# Patient Record
Sex: MALE | Race: WHITE | Employment: OTHER | URBAN - NONMETROPOLITAN AREA
[De-identification: names, ages, dates, MRNs, and addresses within clinical notes are randomized per-mention and may not be internally consistent; named-entity substitution may affect disease eponyms.]

---

## 2018-08-26 ENCOUNTER — HOSPITAL ENCOUNTER (EMERGENCY)
Age: 58
Discharge: HOME | End: 2018-08-26
Payer: MEDICARE

## 2018-08-26 ENCOUNTER — APPOINTMENT (OUTPATIENT)
Dept: GENERAL RADIOLOGY | Age: 58
End: 2018-08-26
Payer: MEDICARE

## 2018-08-26 VITALS
WEIGHT: 240 LBS | HEIGHT: 66 IN | BODY MASS INDEX: 38.57 KG/M2 | OXYGEN SATURATION: 96 % | HEART RATE: 91 BPM | TEMPERATURE: 99.5 F | RESPIRATION RATE: 16 BRPM

## 2018-08-26 DIAGNOSIS — M54.32 SCIATICA OF LEFT SIDE: Primary | ICD-10-CM

## 2018-08-26 PROCEDURE — 99283 EMERGENCY DEPT VISIT LOW MDM: CPT

## 2018-08-26 PROCEDURE — 73502 X-RAY EXAM HIP UNI 2-3 VIEWS: CPT

## 2018-08-26 PROCEDURE — 72100 X-RAY EXAM L-S SPINE 2/3 VWS: CPT

## 2018-08-26 RX ORDER — IBUPROFEN 600 MG/1
600 TABLET ORAL EVERY 8 HOURS PRN
Qty: 20 TABLET | Refills: 0 | OUTPATIENT
Start: 2018-08-26 | End: 2021-06-10

## 2018-08-26 RX ORDER — CYCLOBENZAPRINE HCL 10 MG
10 TABLET ORAL 3 TIMES DAILY PRN
Qty: 12 TABLET | Refills: 0 | Status: SHIPPED | OUTPATIENT
Start: 2018-08-26 | End: 2021-06-10

## 2018-08-26 RX ORDER — PREDNISONE 10 MG/1
TABLET ORAL
Qty: 20 TABLET | Refills: 0 | Status: SHIPPED | OUTPATIENT
Start: 2018-08-26 | End: 2018-09-05

## 2018-08-26 ASSESSMENT — ENCOUNTER SYMPTOMS
ABDOMINAL PAIN: 0
NAUSEA: 0
EYE DISCHARGE: 0
BACK PAIN: 0
WHEEZING: 0
VOMITING: 0
SHORTNESS OF BREATH: 0
RHINORRHEA: 0
EYE REDNESS: 0
DIARRHEA: 0
COUGH: 0
SORE THROAT: 0

## 2018-08-26 ASSESSMENT — PAIN SCALES - GENERAL: PAINLEVEL_OUTOF10: 6

## 2018-08-26 ASSESSMENT — PAIN DESCRIPTION - ORIENTATION: ORIENTATION: LEFT

## 2018-08-26 ASSESSMENT — PAIN DESCRIPTION - LOCATION: LOCATION: BUTTOCKS;BACK;LEG

## 2018-08-26 ASSESSMENT — PAIN DESCRIPTION - FREQUENCY: FREQUENCY: INTERMITTENT

## 2018-08-26 ASSESSMENT — PAIN DESCRIPTION - PAIN TYPE: TYPE: ACUTE PAIN

## 2018-08-26 NOTE — ED PROVIDER NOTES
a past medical history of Arthritis; GERD (gastroesophageal reflux disease); and Hypertension. SURGICAL HISTORY      has no past surgical history on file. CURRENT MEDICATIONS       Discharge Medication List as of 8/26/2018 10:55 AM      CONTINUE these medications which have NOT CHANGED    Details   pravastatin (PRAVACHOL) 40 MG tablet Take 1 tablet by mouth daily. , Disp-30 tablet, R-0      meloxicam (MOBIC) 15 MG tablet Take 1 tablet by mouth daily. , Disp-30 tablet, R-0Normal      lisinopril-hydrochlorothiazide (PRINZIDE;ZESTORETIC) 10-12.5 MG per tablet Take 1 tablet by mouth daily. , Disp-30 tablet, R-11Normal      acetaminophen (TYLENOL) 500 MG tablet Take 500 mg by mouth every 6 hours as needed. ALLERGIES     is allergic to aspirin and pcn [penicillins]. FAMILY HISTORY     has no family status information on file. family history is not on file. SOCIAL HISTORY      reports that he has quit smoking. His smoking use included Cigarettes. He has never used smokeless tobacco. He reports that he drinks alcohol. He reports that he does not use drugs. PHYSICAL EXAM     INITIAL VITALS:  height is 5' 6\" (1.676 m) and weight is 240 lb (108.9 kg). His oral temperature is 99.5 °F (37.5 °C). His pulse is 91. His respiration is 16 and oxygen saturation is 96%. Physical Exam   Constitutional: He is oriented to person, place, and time. He appears well-developed and well-nourished. HENT:   Head: Normocephalic and atraumatic. Right Ear: External ear normal.   Left Ear: External ear normal.   Eyes: Conjunctivae are normal. Right eye exhibits no discharge. Left eye exhibits no discharge. No scleral icterus. Neck: Normal range of motion. Neck supple. No JVD present. Pulmonary/Chest: Effort normal. No stridor. No respiratory distress. Abdominal: Soft. He exhibits no distension. Musculoskeletal: Normal range of motion. He exhibits no edema.         Left hip: Normal. He exhibits normal range of motion, normal strength, no tenderness and no bony tenderness. Lumbar back: Normal. He exhibits normal range of motion, no tenderness, no bony tenderness and no swelling. Left buttock pain secondary to palpation. Neurological: He is alert and oriented to person, place, and time. He exhibits normal muscle tone. GCS eye subscore is 4. GCS verbal subscore is 5. GCS motor subscore is 6. Skin: Skin is warm and dry. He is not diaphoretic. No erythema. Psychiatric: He has a normal mood and affect. His behavior is normal.   Nursing note and vitals reviewed. DIFFERENTIAL DIAGNOSIS:   Differential diagnosis discussed with the patient and available family at the patient's bedside including but not limited to Sciatica, lumbar radiculopathy, fracture, contusion      DIAGNOSTIC RESULTS   EKG:  EKG Interpretation    Interpreted by emergency department physician assistant    None      RADIOLOGY: non-plain film images(s) such as CT, Ultrasound and MRI are read by the radiologist.  @[x] Visualized and interpreted by me  And My attending   [x] Radiologist's Wet Read Report Reviewed   [] Discussed with Radiologist.  [] Will be officially read by the radiologist at a later time. A Wet Read was entered by me. The patient had a   XR LUMBAR SPINE (2-3 VIEWS)   Final Result    No acute fracture or subluxation throughout the lumbar spine. **This report has been created using voice recognition software. It may contain minor errors which are inherent in voice recognition technology. **      Final report electronically signed by Dr Raissa Townsend on 8/26/2018 10:24 AM      XR HIP 2-3 VW W PELVIS LEFT   Final Result   There is no acute fracture or dislocation involving the left hip. **This report has been created using voice recognition software. It may contain minor errors which are inherent in voice recognition technology. **      Final report electronically signed by Dr Raissa Townsend on 8/26/2018 10:22 AM           LABS:   No results found for this visit on 08/26/18. EMERGENCY DEPARTMENT COURSE:   Vitals:    Vitals:    08/26/18 0952   Pulse: 91   Resp: 16   Temp: 99.5 °F (37.5 °C)   TempSrc: Oral   SpO2: 96%   Weight: 240 lb (108.9 kg)   Height: 5' 6\" (1.676 m)       10:03 AM Patient was seen and evaluated in a timely fashion. Patient was seen history physical exam was performed. Patient remained stable here in the emergency department. Patient's  imaging studies and physical examination findings were all reassuring. Upon re-evaluation, the patient is feeling better with a benign repeat examination. Patient was comfortable with the plan of discharge home to followup with the primary care provider in the next 3-5 days. The patient is instructed to return to the emergency department for any worsening of their symptoms. Patient was discharged from the emergency department in good condition with all questions answered. See disposition below    CRITICAL CARE:   None    CONSULTS:  None    PROCEDURES:  None    FINAL IMPRESSION      1.  Sciatica of left side          DISPOSITION/PLAN   Discharged    PATIENT REFERRED TO:  Esthela Albarado MD  [facility] [address] [facility]    Schedule an appointment as soon as possible for a visit in 3 days        DISCHARGE MEDICATIONS:  Discharge Medication List as of 8/26/2018 10:55 AM      START taking these medications    Details   predniSONE (DELTASONE) 10 MG tablet Take 4 tablets by mouth once daily for 5 days, Disp-20 tablet, R-0Print      cyclobenzaprine (FLEXERIL) 10 MG tablet Take 1 tablet by mouth 3 times daily as needed for Muscle spasms, Disp-12 tablet, R-0Print      ibuprofen (ADVIL;MOTRIN) 600 MG tablet Take 1 tablet by mouth every 8 hours as needed for Pain, Disp-20 tablet, R-0Print             (Please note that portions of this note were completed with a voice recognition program.  Efforts were made to edit the dictations but occasionally words are mis-transcribed.)    FARAZ Beard    This patient was seen independently by Dimple Coyle PA-C a Mid-Level Provider in the [facility] Emergency Department. The patient was given an opportunity to see the Emergency Attending. The patient voiced understanding that I was a Mid-Level Provider and was in agreement with being seen independently by myself. Scribe:  Jorge L Quiroz 08/26/18 10:03 AM Scribing for and in the presence of Dimple Coyle PA-C. Provider:  I personally performed the services described in the documentation, reviewed and edited the documentation which was dictated to the scribe in my presence, and it accurately records my words and actions.     Dimple Coyle PA-C 08/26/18 7:56 PM        FARAZ Beard  08/26/18 1084

## 2021-06-10 ENCOUNTER — HOSPITAL ENCOUNTER (EMERGENCY)
Age: 61
Discharge: HOME | End: 2021-06-10
Payer: MEDICARE

## 2021-06-10 ENCOUNTER — APPOINTMENT (OUTPATIENT)
Dept: GENERAL RADIOLOGY | Age: 61
End: 2021-06-10
Payer: MEDICARE

## 2021-06-10 VITALS
OXYGEN SATURATION: 98 % | BODY MASS INDEX: 36.96 KG/M2 | TEMPERATURE: 97.5 F | WEIGHT: 230 LBS | SYSTOLIC BLOOD PRESSURE: 139 MMHG | HEIGHT: 66 IN | RESPIRATION RATE: 18 BRPM | DIASTOLIC BLOOD PRESSURE: 74 MMHG | HEART RATE: 82 BPM

## 2021-06-10 DIAGNOSIS — S93.401A SPRAIN OF RIGHT ANKLE, UNSPECIFIED LIGAMENT, INITIAL ENCOUNTER: Primary | ICD-10-CM

## 2021-06-10 PROCEDURE — 99213 OFFICE O/P EST LOW 20 MIN: CPT

## 2021-06-10 PROCEDURE — 73610 X-RAY EXAM OF ANKLE: CPT

## 2021-06-10 PROCEDURE — 99213 OFFICE O/P EST LOW 20 MIN: CPT | Performed by: EMERGENCY MEDICINE

## 2021-06-10 RX ORDER — NAPROXEN 500 MG/1
500 TABLET ORAL 2 TIMES DAILY WITH MEALS
Qty: 30 TABLET | Refills: 0 | Status: SHIPPED | OUTPATIENT
Start: 2021-06-10

## 2021-06-10 ASSESSMENT — PAIN DESCRIPTION - DESCRIPTORS: DESCRIPTORS: ACHING;SORE

## 2021-06-10 ASSESSMENT — PAIN DESCRIPTION - ORIENTATION: ORIENTATION: LEFT

## 2021-06-10 ASSESSMENT — ENCOUNTER SYMPTOMS
SHORTNESS OF BREATH: 0
COLOR CHANGE: 0
COUGH: 0

## 2021-06-10 ASSESSMENT — PAIN SCALES - GENERAL: PAINLEVEL_OUTOF10: 6

## 2021-06-10 ASSESSMENT — PAIN DESCRIPTION - LOCATION: LOCATION: ANKLE

## 2021-06-10 ASSESSMENT — PAIN DESCRIPTION - ONSET: ONSET: GRADUAL

## 2021-06-10 ASSESSMENT — PAIN DESCRIPTION - PAIN TYPE: TYPE: ACUTE PAIN

## 2021-06-10 ASSESSMENT — PAIN DESCRIPTION - PROGRESSION: CLINICAL_PROGRESSION: GRADUALLY WORSENING

## 2021-06-10 ASSESSMENT — PAIN DESCRIPTION - FREQUENCY: FREQUENCY: CONTINUOUS

## 2021-06-10 ASSESSMENT — PAIN - FUNCTIONAL ASSESSMENT: PAIN_FUNCTIONAL_ASSESSMENT: PREVENTS OR INTERFERES SOME ACTIVE ACTIVITIES AND ADLS

## 2021-06-10 NOTE — ED PROVIDER NOTES
[facility]  Urgent Care Encounter       CHIEF COMPLAINT       Chief Complaint   Patient presents with    Ankle Pain     left       Nurses Notes reviewed and I agree except as noted in the HPI. HISTORY OF PRESENT ILLNESS   Vega Kohler is a 59 y.o. male who presents for complaints of left ankle pain. Patient states approximately 3 to 4 weeks ago a friend was backing a mower off of a trailer. The ramp kicked out and the mower fell. This hit the inside of his left ankle. Patient states initially he did not think it was injured too bad but states he continues to have pain and swelling to the inside of his left ankle. The patient has been taking Tylenol for his pain and it has been helping but since the pain is persisting and he has recurrent swelling, he decided to have this checked out today. Patient currently rates his pain 6 on a 10 scale. HPI    REVIEW OF SYSTEMS     Review of Systems   Constitutional: Negative for fatigue and fever. Respiratory: Negative for cough and shortness of breath. Cardiovascular: Negative for leg swelling. Musculoskeletal: Positive for arthralgias (left ankle), gait problem and joint swelling (left ankle). Skin: Negative for color change. PAST MEDICAL HISTORY         Diagnosis Date    Arthritis     GERD (gastroesophageal reflux disease)     Hypertension        SURGICALHISTORY     Patient  has no past surgical history on file. CURRENT MEDICATIONS       Previous Medications    ACETAMINOPHEN (TYLENOL) 500 MG TABLET    Take 500 mg by mouth every 6 hours as needed. LISINOPRIL-HYDROCHLOROTHIAZIDE (PRINZIDE;ZESTORETIC) 10-12.5 MG PER TABLET    Take 1 tablet by mouth daily. ALLERGIES     Patient is is allergic to aspirin and pcn [penicillins]. Patients   There is no immunization history on file for this patient. FAMILY HISTORY     Patient's family history is not on file.     SOCIAL HISTORY     Patient  reports that he approximately 3 weeks ago. He has medial malleolus pain and tenderness. X-rays were performed with no acute findings. Patient likely has sprain versus contusion to the ankle. Will place patient on short course of naproxen. He is advised he may continue taking Tylenol. Ice to the area frequently. Follow-up with orthopedics if no improvement 1 to 2 weeks. Return for any new concerns. PATIENT REFERRED TO:  Asa Champion MD  [facility] [address] / [facility] [id number]      DISCHARGE MEDICATIONS:  New Prescriptions    NAPROXEN (NAPROSYN) 500 MG TABLET    Take 1 tablet by mouth 2 times daily (with meals)       Discontinued Medications    CYCLOBENZAPRINE (FLEXERIL) 10 MG TABLET    Take 1 tablet by mouth 3 times daily as needed for Muscle spasms    IBUPROFEN (ADVIL;MOTRIN) 600 MG TABLET    Take 1 tablet by mouth every 8 hours as needed for Pain    MELOXICAM (MOBIC) 15 MG TABLET    Take 1 tablet by mouth daily. PRAVASTATIN (PRAVACHOL) 40 MG TABLET    Take 1 tablet by mouth daily.        Current Discharge Medication List          FIORDALIZA Rhodes CNP    (Please note that portions of this note were completed with a voice recognition program. Efforts were made to edit the dictations but occasionally words are mis-transcribed.)          FIORDALIZA Rhodes CNP  06/10/21 1351

## 2021-06-10 NOTE — ED TRIAGE NOTES
Pt to [facility] ambulatory with left ankle pain. Pain started several weeks ago. Pt was unloading a mower off a trailer and mower hit his left ankle area. No bruising or swelling noted to left ankle.